# Patient Record
Sex: MALE | Race: BLACK OR AFRICAN AMERICAN | NOT HISPANIC OR LATINO | URBAN - METROPOLITAN AREA
[De-identification: names, ages, dates, MRNs, and addresses within clinical notes are randomized per-mention and may not be internally consistent; named-entity substitution may affect disease eponyms.]

---
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
## 2024-11-24 ENCOUNTER — EMERGENCY (EMERGENCY)
Age: 19
LOS: 1 days | Discharge: HOME | End: 2024-11-24
Admitting: STUDENT IN AN ORGANIZED HEALTH CARE EDUCATION/TRAINING PROGRAM
Payer: MEDICAID

## 2024-11-24 VITALS
DIASTOLIC BLOOD PRESSURE: 84 MMHG | RESPIRATION RATE: 18 BRPM | TEMPERATURE: 98 F | OXYGEN SATURATION: 98 % | SYSTOLIC BLOOD PRESSURE: 139 MMHG | HEART RATE: 96 BPM | WEIGHT: 162.04 LBS

## 2024-11-24 VITALS
RESPIRATION RATE: 19 BRPM | TEMPERATURE: 98 F | SYSTOLIC BLOOD PRESSURE: 125 MMHG | DIASTOLIC BLOOD PRESSURE: 81 MMHG | OXYGEN SATURATION: 99 % | HEART RATE: 84 BPM

## 2024-11-24 PROCEDURE — 70450 CT HEAD/BRAIN W/O DYE: CPT | Mod: 26,MC

## 2024-11-24 PROCEDURE — 70480 CT ORBIT/EAR/FOSSA W/O DYE: CPT | Mod: 26,59,MC

## 2024-11-24 PROCEDURE — 70486 CT MAXILLOFACIAL W/O DYE: CPT | Mod: 26,MC

## 2024-11-24 PROCEDURE — 99285 EMERGENCY DEPT VISIT HI MDM: CPT

## 2024-11-24 RX ORDER — AMOXICILLIN AND CLAVULANATE POTASSIUM 600; 42.9 MG/5ML; MG/5ML
875 POWDER, FOR SUSPENSION ORAL
Qty: 14 | Refills: 0
Start: 2024-11-24 | End: 2024-11-30

## 2024-11-24 RX ORDER — ACETAMINOPHEN 500 MG
650 TABLET ORAL EVERY 6 HOURS
Refills: 0 | Status: ACTIVE | OUTPATIENT
Start: 2024-11-24 | End: 2025-10-23

## 2024-11-24 RX ADMIN — Medication 650 MILLIGRAM(S): at 16:37
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
## 2024-11-24 NOTE — ED PROVIDER NOTE - PROGRESS NOTE DETAILS
MARY AKERS:  Pt was evaluated by OMFS.  No further inpatient work up or intervention was recommended.  OMFS recommends sinus precautions x 2 weeks, augmentin BID x 1 week and outpatient follow up.  Pt reports improvement in pain.  Pt medically stable for discharge. Strict return precautions given.  Pt to follow up with PMD and OMFS.

## 2024-11-24 NOTE — ED PROVIDER NOTE - PATIENT PORTAL LINK FT
You can access the FollowMyHealth Patient Portal offered by [facility] by registering at the following website: [email or web address]. By joining [facility]’s FollowMyHealth portal, you will also be able to view your health information using other applications (apps) compatible with our system.
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
## 2024-11-24 NOTE — ED PROVIDER NOTE - PHYSICAL EXAMINATION
-Cranial Nerves:  --CN II: PERRLA  --CN III, IV, VI: EOMI b/l  --CN V1-3: Facial sensation intact to touch  --CN VII: No facial asymmetry or droop  --CN VIII: Hearing intact to rubbing fingers b/l  --CN IX, X: Palate elevates symmetrically. Normal phonation  --CN XI: Heading turning and shoulder shrug intact b/l  --CN XII: Tongue midline with normal movements     Normal bilateral FTN and HTS.  Rapid alternating movements intact.  Negative rhomberg.
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
## 2024-11-24 NOTE — ED PROVIDER NOTE - EYE, RIGHT
+periorbital swelling, bruising, abrasions; no discharge; no crepitus/clear/pupils equal, round, and reactive to light

## 2024-11-24 NOTE — ED PROVIDER NOTE - NSFOLLOWUPINSTRUCTIONS_ED_ALL_ED_FT
Advance activity as tolerated.  Continue all previously prescribed medications as directed unless otherwise instructed. Apply cool compresses for 15 minutes to affected area, 3-4 times per day.   Follow up with your primary care physician in 48-72 hours- bring copies of your results.  Return to the Emergency Department for worsening or persistent symptoms, including but not limited to worsening/persistent eye pain, blurred vision, vision loss, double vision, pain with eye movement, fevers, headache, and/or ANY NEW OR CONCERNING SYMPTOMS. If you have issues obtaining follow up, please call: [phone] to obtain a doctor or specialist who takes your insurance in your area.  You may call [phone] to make an appointment with the internal medicine clinic. Advance activity as tolerated.  Continue all previously prescribed medications as directed unless otherwise instructed.    Apply bacitracin (available over the counter) twice daily to affected area until healed.     Take Augmentin, one pill, every 12 hours for 7 days.      - Sinus precautions for 2 weeks as follow:   - Avoid blowing your nose: Gently wipe away nasal secretions instead.   - Avoid straws: Don't drink liquids through a straw.   - Avoid smoking: Smoking can make healing more difficult.   - Sneeze with your mouth open: Avoid pinching your nose to block a sneeze.   - Avoid activities that change pressure: Don't swim, scuba dive, play wind instruments, or blow up balloons.  - Avoid straining: Don't strain by lifting or pushing heavy objects    - Follow up at [facility] clinic in 1-2 weeks (Phone: [phone])    Follow up with your primary care physician in 48-72 hours- bring copies of your results.  Return to the Emergency Department for worsening or persistent symptoms, including but not limited to worsening/persistent eye pain, blurred vision, vision loss, double vision, pain with eye movement, fevers, headache, and/or ANY NEW OR CONCERNING SYMPTOMS. If you have issues obtaining follow up, please call: [phone] to obtain a doctor or specialist who takes your insurance in your area.  You may call [phone] to make an appointment with the internal medicine clinic.
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
